# Patient Record
Sex: MALE | Race: WHITE | URBAN - METROPOLITAN AREA
[De-identification: names, ages, dates, MRNs, and addresses within clinical notes are randomized per-mention and may not be internally consistent; named-entity substitution may affect disease eponyms.]

---

## 2018-02-24 ENCOUNTER — TELEPHONE (OUTPATIENT)
Dept: FAMILY MEDICINE CLINIC | Facility: CLINIC | Age: 33
End: 2018-02-24

## 2018-02-25 NOTE — TELEPHONE ENCOUNTER
Patient called stated his medication script is wrong  He takes lexapro 20 mg- 2 tablets at bedtime for the past 10 years  Cross referenced with all scripts and he does and the script he was given states lexapro 20 mg qd  Called medication into [facility]